# Patient Record
Sex: FEMALE | Race: AMERICAN INDIAN OR ALASKA NATIVE
[De-identification: names, ages, dates, MRNs, and addresses within clinical notes are randomized per-mention and may not be internally consistent; named-entity substitution may affect disease eponyms.]

---

## 2020-12-07 ENCOUNTER — HOSPITAL ENCOUNTER (EMERGENCY)
Dept: HOSPITAL 5 - ED | Age: 50
Discharge: HOME | End: 2020-12-07
Payer: SELF-PAY

## 2020-12-07 VITALS — DIASTOLIC BLOOD PRESSURE: 79 MMHG | SYSTOLIC BLOOD PRESSURE: 136 MMHG

## 2020-12-07 DIAGNOSIS — J06.9: Primary | ICD-10-CM

## 2020-12-07 PROCEDURE — 99281 EMR DPT VST MAYX REQ PHY/QHP: CPT

## 2020-12-07 NOTE — EMERGENCY DEPARTMENT REPORT
- General


Chief Complaint: Medical Clearance


Stated Complaint: FEVER/HEADACHE/WHEEZING


Time Seen by Provider: 12/07/20 10:37


Source: patient


Mode of arrival: Ambulatory


Limitations: No Limitations





- History of Present Illness


Initial Comments: 





Patient is a 50-year-old female presents emergency room with complaints of "not 

feeling well" since three days ago.  She has associated fever of 102 which 

resolved after taking medication.  She states that she has not taken anything 

for a fever today.  She states that she believes her fever broke.  She states 

that she is also had fatigue, generalized body aches, headache, chills.  She 

denies any nausea, vomiting, diarrhea, cough, shortness of breath, abdominal 

pain, chest pain.  She states that her grandchildren do have colds.  She states 

that she also traveled to [place] during Thanksgiving holiday.  She has a past 

medical history of lupus and fibromyalgia.  No allergies to medications.  She 

states that she is postmenopausal.





- Related Data


                                    Allergies











Allergy/AdvReac Type Severity Reaction Status Date / Time


 


No Known Allergies Allergy   Unverified 12/07/20 10:17














ED Review of Systems


ROS: 


Stated complaint: FEVER/HEADACHE/WHEEZING


Other details as noted in HPI





Comment: All other systems reviewed and negative





ED Past Medical Hx





- Past Medical History


Previous Medical History?: Yes


Additional medical history: Lupus





ED Physical Exam





- General


Limitations: No Limitations


General appearance: alert, in no apparent distress





- Head


Head exam: Present: atraumatic, normocephalic





- Eye


Eye exam: Present: normal appearance





- ENT


ENT exam: Present: normal orophraynx, mucous membranes moist, TM's normal 

bilaterally, normal external ear exam





- Respiratory


Respiratory exam: Present: normal lung sounds bilaterally.  Absent: respiratory 

distress, wheezes, rales, rhonchi, stridor, chest wall tenderness, accessory 

muscle use, decreased breath sounds, prolonged expiratory





- Cardiovascular


Cardiovascular Exam: Present: regular rate, normal rhythm, normal heart sounds. 

Absent: systolic murmur, diastolic murmur, rubs, gallop





- Extremities Exam


Extremities exam: Present: normal inspection, full ROM, normal capillary refill.

 Absent: tenderness, joint swelling, calf tenderness





- Neurological Exam


Neurological exam: Present: alert, oriented X3





- Psychiatric


Psychiatric exam: Present: normal affect, normal mood





- Skin


Skin exam: Present: warm, dry, intact





ED Course


                                   Vital Signs











  12/07/20





  10:19


 


Temperature 98.0 F


 


Pulse Rate 71


 


Respiratory 18





Rate 


 


Blood Pressure 136/79





[Right] 


 


O2 Sat by Pulse 98





Oximetry 














ED Medical Decision Making





- Medical Decision Making





Patient is a 50-year-old female presents emergency room with complaints of "not 

feeling well" since three days ago.  She has associated fever of 102 which 

resolved after taking medication.  She states that she has not taken anything 

for a fever today.  She states that she believes her fever broke.  She states 

that she is also had fatigue, generalized body aches, headache, chills.  She 

denies any nausea, vomiting, diarrhea, cough, shortness of breath, abdominal 

pain, chest pain.  She states that her grandchildren do have colds.  She states 

that she also traveled to [place] during Thanksgiving holiday.  She has a past 

medical history of lupus and fibromyalgia.  No allergies to medications.  She 

states that she is postmenopausal.  Vitals are stable.  Patient has no fever, no

 tachycardia, no hypoxia.  On exam: Normal oropharynx, normal TMs and canals, 

normal breath sounds bilaterally, no wheezing, no rales, no rhonchi, no stridor,

 neurosurgeons, no accessory muscle use.  Symptoms likely related to a viral 

URI.  She has no clinical signs or symptoms of bacterial pneumonia or bacterial 

bronchitis.  She has no clinical signs or symptoms of dehydration.  Patient is 

presenting with the symptoms during COVID-19 pandemic, discussed COVID-19 with 

patient, discuss strict return precautions, discussed outpatient testing, 

discussed self quarantine.  Patient does not meet hospital criteria for admissi

on or for hospital COVID-19 testing. advised pt Please increase your fluid 

intake over the next several days.  May take Tylenol as needed for fever or body

 aches.  May take over-the-counter cold symptom relief medication such as 

Mucinex or TheraFlu.   Follow-up with a primary care doctor for reexamination.  

Return to emergency room immediately for any new or worsening symptoms including

 but not limited to difficulty breathing, shortness of breath, severe chest 

pain, unable to tolerate by mouth intake, etc.  Please self quarantine for 10 

days from the onset of your symptoms.  Please do not go out in public.  If you 

are around others at home please wear a mask.  If you need to cough or sneeze 

please do so in a napkin and immediately throw it away and immediately wash your

 hands.  Wash your hands frequently.  Wipe everything down.  Recommend for you 

to get COVID-19 testing, may have this done at primary care doctor, health 

department, [facility] thru testing center.





- Differential Diagnosis


URI, PNA, bronchitis, COVID 19, influenza, viral syndrome 


Critical care attestation.: 


If time is entered above; I have spent that time in minutes in the direct care 

of this critically ill patient, excluding procedure time.








ED Disposition


Clinical Impression: 


 Viral URI





Disposition: DC-01 TO HOME OR SELFCARE


Is pt being admited?: No


Does the pt Need Aspirin: No


Condition: Stable


Instructions:  Viral Respiratory Infection, Easy-To-Read


Additional Instructions: 


Please increase your fluid intake over the next several days.  May take Tylenol 

as needed for fever or body aches.  May take over-the-counter cold symptom 

relief medication such as Mucinex or TheraFlu.   Follow-up with a primary care 

doctor for reexamination.  Return to emergency room immediately for any new or 

worsening symptoms including but not limited to difficulty breathing, shortness 

of breath, severe chest pain, unable to tolerate by mouth intake, etc.  Please 

self quarantine for 10 days from the onset of your symptoms.  Please do not go 

out in public.  If you are around others at home please wear a mask.  If you 

need to cough or sneeze please do so in a napkin and immediately throw it away 

and immediately wash your hands.  Wash your hands frequently.  Wipe everything 

down.  Recommend for you to get COVID-19 testing, may have this done at primary 

care doctor, [facility].


Referrals: 


BAR LUCAS MD [Staff Physician] - 2-3 Days


DANE MCINTYRE MD [Staff Physician] - 2-3 Days


[facility] [Provider Group] - 2-3 Days


ANABEL SINGH MD [Staff Physician] - 2-3 Days


PRIMARY CARE,MD [Primary Care Provider] - 2-3 Days


Forms:  Work/School Release Form(ED)


Time of Disposition: 10:40


Print Language: ENGLISH

## 2022-08-08 ENCOUNTER — OFFICE VISIT (OUTPATIENT)
Dept: URBAN - METROPOLITAN AREA CLINIC 118 | Facility: CLINIC | Age: 52
End: 2022-08-08

## 2022-10-10 ENCOUNTER — OFFICE VISIT (OUTPATIENT)
Dept: URBAN - METROPOLITAN AREA CLINIC 118 | Facility: CLINIC | Age: 52
End: 2022-10-10